# Patient Record
Sex: FEMALE | Race: WHITE | NOT HISPANIC OR LATINO | URBAN - METROPOLITAN AREA
[De-identification: names, ages, dates, MRNs, and addresses within clinical notes are randomized per-mention and may not be internally consistent; named-entity substitution may affect disease eponyms.]

---

## 2023-01-02 ENCOUNTER — HOSPITAL ENCOUNTER (EMERGENCY)
Facility: HOSPITAL | Age: 78
Discharge: HOME | End: 2023-01-03
Attending: STUDENT IN AN ORGANIZED HEALTH CARE EDUCATION/TRAINING PROGRAM
Payer: MEDICARE

## 2023-01-02 DIAGNOSIS — R52 PAIN: ICD-10-CM

## 2023-01-02 DIAGNOSIS — S39.012A BACK STRAIN, INITIAL ENCOUNTER: Primary | ICD-10-CM

## 2023-01-03 VITALS
TEMPERATURE: 98 F | WEIGHT: 185 LBS | SYSTOLIC BLOOD PRESSURE: 193 MMHG | DIASTOLIC BLOOD PRESSURE: 93 MMHG | HEIGHT: 64 IN | OXYGEN SATURATION: 100 % | HEART RATE: 65 BPM | RESPIRATION RATE: 16 BRPM | BODY MASS INDEX: 31.58 KG/M2

## 2023-01-03 PROCEDURE — 99283 EMERGENCY DEPT VISIT LOW MDM: CPT

## 2023-01-03 PROCEDURE — 25000003 PHARM REV CODE 250: Performed by: STUDENT IN AN ORGANIZED HEALTH CARE EDUCATION/TRAINING PROGRAM

## 2023-01-03 RX ORDER — HYDROCODONE BITARTRATE AND ACETAMINOPHEN 10; 325 MG/1; MG/1
1 TABLET ORAL
Status: COMPLETED | OUTPATIENT
Start: 2023-01-03 | End: 2023-01-03

## 2023-01-03 RX ORDER — HYDROCODONE BITARTRATE AND ACETAMINOPHEN 5; 325 MG/1; MG/1
1 TABLET ORAL EVERY 6 HOURS PRN
Qty: 10 TABLET | Refills: 0 | Status: SHIPPED | OUTPATIENT
Start: 2023-01-03

## 2023-01-03 RX ADMIN — HYDROCODONE BITARTRATE AND ACETAMINOPHEN 1 TABLET: 10; 325 TABLET ORAL at 12:01

## 2023-01-03 NOTE — ED PROVIDER NOTES
Encounter Date: 1/2/2023       History     Chief Complaint   Patient presents with    Back Pain     Pt complaints of pain to left back near bra line. S/S started 3 days ago. Pt seen by  And prescribed meds but not working.        Patient is a 77-year-old white female with no significant past medical history presented to the ER with a 3 day history of back pain.  Patient states that it all started when she was lifting a heavy umbrella. Patient states she was seen at urgent care for today and was prescribed Toradol and muscle relaxers but states that she still in pain.  Patient denies any rashes.  Patient denies any trauma.  Patient denies any urinary incontinence, urinary retention, fecal incontinence, saddle anesthesia.  Patient states pain is aching, worse with movement and better with rest.  Patient denies any other complaints.    Review of patient's allergies indicates:  No Known Allergies  No past medical history on file.  No past surgical history on file.  No family history on file.     Review of Systems   Constitutional:  Negative for chills, fatigue and fever.   HENT:  Negative for congestion, sore throat and trouble swallowing.    Eyes:  Negative for pain and visual disturbance.   Respiratory:  Negative for cough, shortness of breath and wheezing.    Cardiovascular:  Negative for chest pain and palpitations.   Gastrointestinal:  Negative for abdominal pain, blood in stool, constipation, diarrhea, nausea and vomiting.   Genitourinary:  Negative for dysuria and hematuria.   Musculoskeletal:  Positive for back pain. Negative for myalgias.   Skin:  Negative for rash and wound.   Neurological:  Negative for seizures, syncope and headaches.   Psychiatric/Behavioral:  Negative for confusion. The patient is not nervous/anxious.      Physical Exam     Initial Vitals [01/02/23 2212]   BP Pulse Resp Temp SpO2   (!) 206/94 72 20 97.7 °F (36.5 °C) 98 %      MAP       --         Physical Exam    Nursing note and vitals  reviewed.  Constitutional: She appears well-developed and well-nourished. She is not diaphoretic. No distress.   HENT:   Head: Normocephalic.   Right Ear: External ear normal.   Left Ear: External ear normal.   Nose: Nose normal.   Eyes: Conjunctivae and EOM are normal. Right eye exhibits no discharge. Left eye exhibits no discharge. No scleral icterus.   Neck:   Normal range of motion.  Cardiovascular:  Normal rate, regular rhythm, normal heart sounds and intact distal pulses.     Exam reveals no gallop and no friction rub.       No murmur heard.  Pulmonary/Chest: Breath sounds normal. No stridor. No respiratory distress. She has no wheezes. She has no rhonchi. She has no rales.   Abdominal: Abdomen is soft. She exhibits no distension. There is no abdominal tenderness. There is no rebound and no guarding.   Musculoskeletal:         General: Tenderness present. No edema. Normal range of motion.      Cervical back: Normal range of motion.      Comments: There is no C, T, L-spine tenderness no step-off lesions.  Tenderness palpation between the L3-L5 region left parasternal region.  No obvious rashes deformities or trauma.     Neurological: She is alert.   Skin: Skin is warm. No rash noted. No erythema.   Psychiatric: She has a normal mood and affect. Her behavior is normal.       ED Course   Procedures  Labs Reviewed - No data to display       Imaging Results              X-Ray Thoracic Spine AP Lateral (In process)                   X-Rays:   Independently Interpreted Readings:   Other Readings:   No acute osseous abnormalities per my read  Medications   HYDROcodone-acetaminophen  mg per tablet 1 tablet (1 tablet Oral Given 1/3/23 0018)     Medical Decision Making:   Initial Assessment:     Overall well-appearing 77-year-old female  Differential Diagnosis:    Muscle strain, fracture, zoster  Clinical Tests:   Radiological Study: Ordered and Reviewed  ED Management:   Vital signs stable patient hypertensive    Denies all symptoms of hypertensive urgency or emergency   Blood pressure improved after hydrocodone was given   Patient denies that the pain is tearing   Patient denies chest pain   X-ray performed showed no acute fractures per my read   Rice therapy advised   [place] sent to pharmacy and driving restrictions were discussed   Patient has a ride home   Return precautions discussed and follow up with PC                        Clinical Impression:   Final diagnoses:  [R52] Pain  [S39.012A] Back strain, initial encounter (Primary)        ED Disposition Condition    Discharge Stable          ED Prescriptions       Medication Sig Dispense Start Date End Date Auth. Provider    HYDROcodone-acetaminophen (NORCO) 5-325 mg per tablet Take 1 tablet by mouth every 6 (six) hours as needed for Pain. 10 tablet 1/3/2023 -- Demar Chaidez MD          Follow-up Information       Follow up With Specialties Details Why Contact Info    [facility] - Emergency Dept Emergency Medicine  If symptoms worsen [address] [facility] [id number] [phone]             Demar Chaidez MD  01/03/23 0105